# Patient Record
Sex: FEMALE | Race: WHITE | NOT HISPANIC OR LATINO | URBAN - METROPOLITAN AREA
[De-identification: names, ages, dates, MRNs, and addresses within clinical notes are randomized per-mention and may not be internally consistent; named-entity substitution may affect disease eponyms.]

---

## 2021-12-20 ENCOUNTER — TRANSFERRED RECORDS (OUTPATIENT)
Dept: HEALTH INFORMATION MANAGEMENT | Facility: CLINIC | Age: 41
End: 2021-12-20
Payer: COMMERCIAL

## 2022-01-04 ENCOUNTER — MEDICAL CORRESPONDENCE (OUTPATIENT)
Dept: HEALTH INFORMATION MANAGEMENT | Facility: CLINIC | Age: 42
End: 2022-01-04
Payer: COMMERCIAL

## 2022-01-17 ENCOUNTER — TELEPHONE (OUTPATIENT)
Dept: OPHTHALMOLOGY | Facility: CLINIC | Age: 42
End: 2022-01-17
Payer: COMMERCIAL

## 2022-01-17 ENCOUNTER — TRANSCRIBE ORDERS (OUTPATIENT)
Dept: OTHER | Age: 42
End: 2022-01-17
Payer: COMMERCIAL

## 2022-01-17 DIAGNOSIS — H53.40 VISUAL FIELD DEFECT: Primary | ICD-10-CM

## 2022-01-17 NOTE — TELEPHONE ENCOUNTER
[facility] Call Center    Phone Message    May a detailed message be left on voicemail: yes     Reason for Call: Other: Shreya from [facility] called to schedule for Pt, per protocols, writer is sending TE to schedule Pt. Thank you.      Action Taken: Message routed to:  [facility] EYE    Travel Screening: Not Applicable

## 2022-01-17 NOTE — TELEPHONE ENCOUNTER
I spoke to the patient because I could not locate the referral in [facility].  The patient has had genetic testing.  She sees Dr. Allen who wants her to have an ERG/OEG.  Message sent to

## 2022-01-18 NOTE — TELEPHONE ENCOUNTER
[facility] Call Center    Phone Message    May a detailed message be left on voicemail: yes     Reason for Call: Appointment Intake    Referring Provider Name: Nestor Allen @ [facility]   . [phone]  Fax [phone]  Diagnosis and/or Symptoms: Visual field defect [H53.40]  My Clinical Question Is:   ERG testing        Action Taken: Message routed to:  [facility]): Theron Tejada [[id number]]    Travel Screening: Not Applicable

## 2022-02-23 NOTE — TELEPHONE ENCOUNTER
Spoke w/patient.  After review of outside outpt notes, confirmed that pt is wearing ctl.  Informed okay to come in wearing ctl but to bring PG with to confirm actual rx.  Had brief discussion regarding ffERG and EOG procedures to be done tmro.  Pt mentions genetic testing already done and results inconclusive. Formal report will be electronically sent to referring provider and it is their office who will be contacting pt regarding next steps.  My direct ph# was given prn ([phone]).

## 2022-02-24 ENCOUNTER — ALLIED HEALTH/NURSE VISIT (OUTPATIENT)
Dept: OPHTHALMOLOGY | Facility: CLINIC | Age: 42
End: 2022-02-24
Attending: OPHTHALMOLOGY
Payer: COMMERCIAL

## 2022-02-24 DIAGNOSIS — H35.89 OTHER SPECIFIED RETINAL DISORDERS: ICD-10-CM

## 2022-02-24 DIAGNOSIS — H35.50 RETINAL DYSTROPHY: Primary | ICD-10-CM

## 2022-02-24 PROCEDURE — 92270 ELECTRO-OCULOGRAPHY W/I&R: CPT

## 2022-02-24 PROCEDURE — 999N000103 HC STATISTIC NO CHARGE FACILITY FEE

## 2022-02-24 PROCEDURE — 92273 FULL FIELD ERG W/I&R: CPT

## 2022-02-24 ASSESSMENT — REFRACTION_WEARINGRX
SPECS_TYPE: SVL
OS_SPHERE: -2.75
OD_SPHERE: -2.75

## 2022-02-24 ASSESSMENT — VISUAL ACUITY
METHOD: SNELLEN - LINEAR
OS_CC: 20/20
OD_CC: 20/20
CORRECTION_TYPE: CONTACTS
OD_CC+: -2+1

## 2022-02-24 NOTE — NURSING NOTE
[facility].  Last eye exam w/Dr. Allen 12-16-21:      Visual field defect Right>Left. HVF shows more peripheral field loss Right>Left. Patient noticed initially over the last year.  No night blindness.  Pt states able to see stars at night, able to navigate.  However, concerned about driving because finds that she is not aware of objects/people on right temporal-superiorly and has hit her head numerous times lately.    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  Patient has attenuated vessels both eyes and some pigmentary changes in the periphery.  FA shows late leakage in both eyes in the periphery.    H/O breast cancer, in remission; s/p lumpectomy, right 2017. NO signs of tamoxifen toxicity that was seen in the past with high dose therapy.  Patient is on low dose prophylaxis commonly used today.    Outside outpt notes state OCT mac, Optos, FAF and genetic testing done 12-16-21.  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  No f/u yet scheduled; will await results.

## 2022-02-24 NOTE — LETTER
2022    STANDARD ffERG REPORT    Referring: Nestor Allen MD / Suz[facility]    RE: Shania Farias  MRN: [id number]  : 1980     ffERG Date:  2022    IMPRESSION:   Jairo-cone dysfunction     CLINICAL HISTORY: Referred by Dr. Nestor Allen/S[facility]anne for ffERG+EOG.  Last eye exam with Dr. Allen 21:  Visual field defect Right>Left. HVF shows more peripheral field loss Right>Left. Patient noticed initially over the last year.  No night blindness.  Pt states able to see stars at night, able to navigate.  However, concerned about driving because finds that she is not aware of objects/people on right temporal-superiorly and has hit her head numerous times lately. 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  Patient has attenuated vessels both eyes and some pigmentary changes in the periphery.  FA shows late leakage in both eyes in the periphery. H/O breast cancer, in remission; s/p lumpectomy, right . NO signs of tamoxifen toxicity that was seen in the past with high dose therapy.  Patient is on low dose prophylaxis commonly used today. Outside outpt notes state OCT mac, Optos, FAF and genetic testing done 21.  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             Visual Acuity:  Right Eye: 20/20-2+1                   w/ctl, PG -2.75sph       Left Eye: 20/20       w/ctl, PG -2.75sph                                                                                                                                                                                                                                                                                                                                                                                            ALL AVERAGED                                                     Data for Full-Field ERG  Right Eye  Left Eye   DARK-ADAPTED Patient Normal Patient   0.01 ERG (jairo) b-wave amplitude ( v)  26* 45.1 to 368.9 33   0.01 ERG (sis) b-wave implicit time (ms) 67.1* 76.8 to 113.8 66         3.0 ERG (combined) a-wave amplitude ( v) -26 -247.4 to -97 -21   3.0 ERG (combined) a-wave implicit time (ms) 16.1 11.6 to 19.6 15.5   3.0 ERG (combined) b-wave amplitude ( v) 54 188 to 379.6 58   3.0 ERG (combined) b-wave implicit time (ms) 54.9 37.2 to 58.4 53.3         10.0 ERG (brighter combined) a-wave amplitude ( v) -31 -265.1 to -118.7 -26   10.0 ERG (brighter combined) a-wave implicit time (ms) 13.9 11.3 to 14.1 12.8   10.0 ERG (brighter combined) b-wave amplitude ( v) 55 173.5 to 445.9 53   10.0 ERG (brighter combined) b-wave implicit time (ms) 55.5 33.5 to 54.7 51.6         3.0 Oscillatory Potentials Reduced  Present  Reduced    LIGHT-ADAPTED       3.0 Flicker (30Hz) amplitude ( v) 20 52.5 to 151.3 19   3.0 Flicker (30Hz) implicit time (ms) 27.2 21.8 to 30.8 27.2         3.0 ERG (cone) a-wave amplitude ( v) -4 -49.3 to -16.7 -7   3.0 ERG (cone) a-wave implicit time (ms) 14.4 10.6 to 16.2 15   3.0 ERG (cone) b-wave amplitude ( v) 17 73.9 to 167.1 17   3.0 ERG (cone) b-wave implicit time (ms) 30.5 26.7 to 34.1 31.6   * = manipulated cursors  parentheses = cursors at selected peaks  ---- = residual to non-measurable  xxxx = not tested      Tech notes:   ffERG discussed and performed with E3 system.  Equally well-dilated ~10mm.  Tolerated dtl nicely.  Slight brow ptosis but equal and adequate eye opening w/only minimal effort needed to clear dilated pupils.  Impedances low and comparable throughout.  No difficulty w/blinking.  Specifically, no eyelid flutter to bright flashes and with flicker.     INTERPRETATION:  This full-field electroretinogram was performed according to ISCEV standards using the [employer] E3 system and DTL fiber-recording electrodes. The patient tolerated the testing well. The waveforms are fairly reproducible and well formed. The normative values provided above represent the 95% confidence limits  for a normal individual the age of the patient. The patient s responses are averaged.  There is mild asymmetry of the responses in between both eyes.    In dark-adapted conditions, the sis-specific responses have moderately reduced amplitudes in both eyes and the implicit time is delayed in both eyes. The maximal response, a combined sis and cone response, has moderately to severely reduced amplitudes in both eyes and the implicit time is normal. With a higher intensity flash, the responses improve but are persistently reduced in both eyes. The bright flash (scotopic 10.0) response is not electronegative. The oscillatory potentials are reduced bilaterally.      In light-adapted conditions, the 30-Hz flicker responses have abnormal amplitudes and the implicit time is normal in both eyes. The single photopic responses have abnormal amplitudes and the implicit time is normal in both eyes.    CONCLUSION: This electroretinogram is abnormal, showing loss of sis/cone function. The etiology for this is unknown and could be consistent with retinal dystrophy. The differential diagnoses include: post-inflammatory retinopathy, toxic retinopathy, and autoimmune or cancer associated retinopathy. Clinical correlation is recommended.    A repeat electroretinogram could be considered in 1-2 years, or earlier if the clinical situation changes.           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  ffERG done just prior.  Light-adapted 15 minutes after ffERG.  Monitored and exhibited good fix and follow. No light sensitivity (squinting etc).    INTERPRETATION:   The [place] ratio, the ratio of the light peak to dark trough potential, is used to determine the normalcy of the results. An [place]  ratio of 1.80 or greater is normal, 1.65 to 1.80 is subnormal, and < 1.65 is significantly subnormal.       Dear Nestor, thank you for the opportunity to provide electrophysiologic services for this patient.  Please do not hesitate to call if there should be any questions regarding these results.    Sincerely,    Amanda Galvan MD      Electrophysiology Service   Department of Ophthalmology & Visual Neurosciences   [facility]  Phone: [phone]   Fax: [phone]

## 2022-02-24 NOTE — PROGRESS NOTES
2022    STANDARD EOG REPORT    Referring: Dr. Nestor Allen/Suzanne    RE: Shania Farias  MRN: [id number]  : 1980                 EOG Date:  2022    CLINICAL HISTORY: Referred by Dr. Nestor Allen/S[facility]anne for ffERG+EOG.  Last eye exam with Dr. Allen 21:  Visual field defect Right>Left. HVF shows more peripheral field loss Right>Left. Patient noticed initially over the last year.  No night blindness.  Pt states able to see stars at night, able to navigate.  However, concerned about driving because finds that she is not aware of objects/people on right temporal-superiorly and has hit her head numerous times lately. 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  Patient has attenuated vessels both eyes and some pigmentary changes in the periphery.  FA shows late leakage in both eyes in the periphery. H/O breast cancer, in remission; s/p lumpectomy, right 2017. NO signs of tamoxifen toxicity that was seen in the past with high dose therapy.  Patient is on low dose prophylaxis commonly used today. Outside outpt notes state OCT mac, Optos, FAF and genetic testing done 21.  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      IMPRESSION: abnormal electrooculogram (EOG) indicating dysfunction of the Retinal pigment epithelium     Visual Acuity Right Eye : 20/20-2+1      w/ctl, PG -2.75sph    Visual Acuity Left Eye : 20/20      w/ctl, PG -2.75sph    DATA FOR EOG Right Eye Left Eye NORMAL   [place] ratio 1.06 1.15 >1.8     Tech notes:  EOG discussed and performed w/E3 system.  ffERG done just prior.  Light adapted 15 minutes after ffERG.  Monitored and exhibited good fix and follow. No light sensitivity (squinting etc).     INTERPRETATION:   The [place] ratio, the ratio of the Light peak to dark trough potentials is used to determine the normalcy of the results. An [place] ratio of 1.80 or greater is normal, 1.65 to 1.80 is subnormal, and < 1.65 is  significantly subnormal.     Sincerely,    Amanda Galvna MD  .    Electrophysiology Service   Department of Ophthalmology & Visual Neurosciences   [facility]  Phone: [phone]   Fax: [phone]     STANDARD ffERG REPORT    RE:  Shania Farias  MRN:  [id number]  :  1980    ffERG Date:  2022      IMPRESSION:  Jairo- cone dysfunction                  Visual Acuity Right Eye : 20/20-2+1      w/ctl, PG -2.75sph    Visual Acuity Left Eye : 20/20      w/ctl, PG -2.75sph                                              ALL AVERAGED                                                                                           Data for Full-Field ERG  Right Eye  Left Eye   DARK-ADAPTED Patient Normal Patient   0.01 ERG (jairo) b-wave amplitude ( v) 26* 45.1 to 368.9 33   0.01 ERG (jairo) b-wave implicit time (ms) 67.1* 76.8 to 113.8 66         3.0 ERG (combined) a-wave amplitude ( v) -26 -247.4 to -97 -21   3.0 ERG (combined) a-wave implicit time (ms) 16.1 11.6 to 19.6 15.5   3.0 ERG (combined) b-wave amplitude ( v) 54 188 to 379.6 58   3.0 ERG (combined) b-wave implicit time (ms) 54.9 37.2 to 58.4 53.3         10.0 ERG (brighter combined) a-wave amplitude ( v) -31 -265.1 to -118.7 -26   10.0 ERG (brighter combined) a-wave implicit time (ms) 13.9 11.3 to 14.1 12.8   10.0 ERG (brighter combined) b-wave amplitude ( v) 55 173.5 to 445.9 53   10.0 ERG (brighter combined) b-wave implicit time (ms) 55.5 33.5 to 54.7 51.6         3.0 Oscillatory Potentials  present    LIGHT-ADAPTED       3.0 Flicker (30Hz) amplitude ( v) 20 52.5 to 151.3 19   3.0 Flicker (30Hz) implicit time (ms) 27.2 21.8 to 30.8 27.2         3.0 ERG (cone) a-wave amplitude ( v) -4 -49.3 to -16.7 -7   3.0 ERG (cone) a-wave implicit time (ms) 14.4 10.6 to 16.2 15   3.0 ERG (cone) b-wave amplitude ( v) 17 73.9 to 167.1 17   3.0 ERG (cone) b-wave implicit time (ms) 30.5 26.7 to 34.1 31.6   * = manipulated  cursors  parentheses = cursors at selected peaks  ---- = residual to non-measurable  xxxx = not tested      Tech notes:   ffERG discussed and performed with E3 system.  Equally well-dilated ~10mm.  Tolerated dtl nicely.  Slight brow ptosis but equal and adequate eye opening w/only minimal effort needed to clear dilated pupils.  Impedances low and comparable throughout.  No difficulty w/blinking.  Specifically, no eyelid flutter to bright flashes and with flicker.     INTERPRETATION:        This full field electroretinogram was performed according to ISCEV standards using [employer] E3 system and DTL fiber recording electrodes. The patient tolerated the testing well.  The waveforms are fairly reproducible and well formed. The normative values provided above represent the 95% confidence limits for a normal individual the age of the patient. The patient s responses are averaged.   There is mild asymmetry of the responses in between both eyes.    In dark adapted conditions, the sis-specific responses have moderate reduced amplitudes in both eyes and the implicit time is delayed in both eyes.  The maximal response, a combined sis and cone responses, have moderate to severe reduced amplitudes in both eyes and the implicit time is  normal.  With a higher intensity flash, the responses improve, but persistently reduced in both eyes.  The bright flash (scotopic 10.0) response is not electronegative.  The oscillatory potentials are reduced bilaterally.      In light adapted conditions,  the 30-Hz flicker responses have abnormal amplitudes and the implicit time is  normal in both eyes.    The single photopic response have abnormal amplitudes and and the implicit time is  normal in both eyes.    Conclusion: This electroretinogram is abnormal, showing loss of sis/cone function. The etiology for this is unknown and could be consistent with retinal dystrophy. The differential diagnosis includes: post-inflammatory retinopathy, toxic  retinopathy and Autoimmune or cancer associated Retinopathy   Clinical correlation is recommended.    A  repeat electroretinogram could be considered in 1-2 years or earlier if the clinical situation changes.     Dear Nestor, thank you for the opportunity to provide electrophysiologic services for this patient.  Please do not hesitate to call if there should be any questions regarding these results.      Amanda Galvan MD  .    Electrophysiology Service   Department of Ophthalmology & Visual Neurosciences   [facility]  Phone: [phone]   Fax: [phone]

## 2022-05-29 ENCOUNTER — HEALTH MAINTENANCE LETTER (OUTPATIENT)
Age: 42
End: 2022-05-29

## 2022-10-03 ENCOUNTER — HEALTH MAINTENANCE LETTER (OUTPATIENT)
Age: 42
End: 2022-10-03

## 2023-02-12 ENCOUNTER — HEALTH MAINTENANCE LETTER (OUTPATIENT)
Age: 43
End: 2023-02-12

## 2024-03-09 ENCOUNTER — HEALTH MAINTENANCE LETTER (OUTPATIENT)
Age: 44
End: 2024-03-09